# Patient Record
(demographics unavailable — no encounter records)

---

## 2025-07-09 NOTE — PHYSICAL EXAM
[General Appearance - Alert] : alert [Oriented To Time, Place, And Person] : oriented to person, place, and time [Person] : oriented to person [Place] : oriented to place [Time] : oriented to time [Limited Balance] : the patient's balance was impaired [] : no respiratory distress

## 2025-07-15 NOTE — HISTORY OF PRESENT ILLNESS
[FreeTextEntry1] : lower back pain [de-identified] : 64 y/o male with hx of multiple spine surgeries. Today he presents with severe lower back pain. Currently ambulating with a walker. Pain exacerbated with ambulation, prolonged standing or sitting. Currently on Vicodin for pain management.  He denies the pain radiating to the lower extremities.  Denies any urinary/bowel dysfunction, saddle anesthesia.

## 2025-07-15 NOTE — ADDENDUM
[FreeTextEntry1] : The patient is a 65-year-old gentleman who presents the office with complaints of severe low back pain.  He states that this has been getting progressively worse over the past 2 years.  He also notes bilateral lower extremity pain that is also getting worse.  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  The first that spans from T10-L2 and another at L4-5.  X-rays of the patient's spine show evidence of pseudoarthrosis and abnormal motion between the 2 constructs.  Given the patient's symptoms and imaging findings, I feel that he would benefit from procedure to revise his fusion and extend his construct from T10 to the pelvis.  I discussed with him the risks, benefits, alternatives and expected outcomes of the surgery.  The patient understood, all of his questions were answered to his satisfaction he would like to proceed with surgery.  He is given instructions regarding medical pretesting as well as scheduling for the operation.

## 2025-07-15 NOTE — HISTORY OF PRESENT ILLNESS
[FreeTextEntry1] : lower back pain [de-identified] : 64 y/o male with hx of multiple spine surgeries. Today he presents with severe lower back pain. Currently ambulating with a walker. Pain exacerbated with ambulation, prolonged standing or sitting. Currently on Vicodin for pain management.  He denies the pain radiating to the lower extremities.  Denies any urinary/bowel dysfunction, saddle anesthesia.

## 2025-07-15 NOTE — ASSESSMENT
[FreeTextEntry1] : Plan:  T10-S1 Fusion Potential surgical risks and benefits and alternative discussed with time allowed for questions and answers given. Patient verbalizes understanding.  Pre-op requirements and postop recovery and expectations discussed regarding the benefits and risks for surgery. Clearance paperwork given to the patient.  G wipes with instructions given to the patient.